# Patient Record
Sex: MALE | Race: WHITE | NOT HISPANIC OR LATINO | Employment: STUDENT | URBAN - METROPOLITAN AREA
[De-identification: names, ages, dates, MRNs, and addresses within clinical notes are randomized per-mention and may not be internally consistent; named-entity substitution may affect disease eponyms.]

---
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
## 2025-07-02 NOTE — ED NOTES
Find out what kind of therapy are they trying to start.  He has diagnosis of ADHD, autism, sensory processing disorder. XR at bedside       [address] [place] [address]  03/09/23 4817